# Patient Record
Sex: FEMALE | Race: WHITE | URBAN - METROPOLITAN AREA
[De-identification: names, ages, dates, MRNs, and addresses within clinical notes are randomized per-mention and may not be internally consistent; named-entity substitution may affect disease eponyms.]

---

## 2019-02-13 ENCOUNTER — OFFICE VISIT (OUTPATIENT)
Dept: URBAN - METROPOLITAN AREA CLINIC 17 | Facility: CLINIC | Age: 69
End: 2019-02-13
Payer: MEDICARE

## 2019-02-13 DIAGNOSIS — H25.13 AGE-RELATED NUCLEAR CATARACT, BILATERAL: ICD-10-CM

## 2019-02-13 PROCEDURE — 99203 OFFICE O/P NEW LOW 30 MIN: CPT | Performed by: OPTOMETRIST

## 2019-02-13 RX ORDER — LOTEPREDNOL ETABONATE 5 MG/G
0.5 % OINTMENT OPHTHALMIC
Qty: 1 | Refills: 0 | Status: INACTIVE
Start: 2019-02-13 | End: 2019-02-20

## 2019-02-13 RX ORDER — TOBRAMYCIN AND DEXAMETHASONE 3; 1 MG/G; MG/G
OINTMENT OPHTHALMIC
Qty: 1 | Refills: 0 | Status: INACTIVE
Start: 2019-02-13 | End: 2019-02-13

## 2019-02-13 ASSESSMENT — INTRAOCULAR PRESSURE
OS: 17
OD: 16

## 2019-02-13 NOTE — IMPRESSION/PLAN
Impression: Allergic contact dermatitis due to other agents: L23.89. OD Plan: Discussed diagnosis in detail with patient. Discussed treatment options with patient. Cont. Doxycycline as prescribed by urgent care. Pt to start Tobradex nisreen TID RUL and conj. Use cold compresses. Advised if pt gets a fever or pain while moving eye- call office or go to ER immediately. Pt to call before weekend if condition worsens.

## 2019-02-20 ENCOUNTER — OFFICE VISIT (OUTPATIENT)
Dept: URBAN - METROPOLITAN AREA CLINIC 17 | Facility: CLINIC | Age: 69
End: 2019-02-20
Payer: MEDICARE

## 2019-02-20 DIAGNOSIS — L23.89 ALLERGIC CONTACT DERMATITIS DUE TO OTHER AGENTS: Primary | ICD-10-CM

## 2019-02-20 PROCEDURE — 99213 OFFICE O/P EST LOW 20 MIN: CPT | Performed by: OPTOMETRIST

## 2019-02-20 RX ORDER — TOBRAMYCIN AND DEXAMETHASONE 3; 1 MG/G; MG/G
OINTMENT OPHTHALMIC
Qty: 1 | Refills: 0 | Status: ACTIVE
Start: 2019-02-20

## 2019-02-20 ASSESSMENT — INTRAOCULAR PRESSURE
OS: 13
OD: 12

## 2019-02-20 NOTE — IMPRESSION/PLAN
Impression: Allergic contact dermatitis due to other agents: L23.89. OD Plan: Discussed diagnosis in detail with patient. Pt to d/c Tobradex nisreen TID RUL and conj.  Finish Doxycycline as directed